# Patient Record
Sex: MALE | Race: WHITE | NOT HISPANIC OR LATINO
[De-identification: names, ages, dates, MRNs, and addresses within clinical notes are randomized per-mention and may not be internally consistent; named-entity substitution may affect disease eponyms.]

---

## 2019-09-09 ENCOUNTER — APPOINTMENT (OUTPATIENT)
Dept: INTERNAL MEDICINE | Facility: CLINIC | Age: 24
End: 2019-09-09

## 2019-11-18 ENCOUNTER — APPOINTMENT (OUTPATIENT)
Dept: INTERNAL MEDICINE | Facility: CLINIC | Age: 24
End: 2019-11-18
Payer: COMMERCIAL

## 2019-11-18 VITALS
RESPIRATION RATE: 14 BRPM | DIASTOLIC BLOOD PRESSURE: 80 MMHG | OXYGEN SATURATION: 98 % | SYSTOLIC BLOOD PRESSURE: 100 MMHG | TEMPERATURE: 98.8 F | WEIGHT: 150 LBS | HEIGHT: 71 IN | BODY MASS INDEX: 21 KG/M2 | HEART RATE: 75 BPM

## 2019-11-18 VITALS
SYSTOLIC BLOOD PRESSURE: 120 MMHG | TEMPERATURE: 98 F | DIASTOLIC BLOOD PRESSURE: 90 MMHG | HEIGHT: 74 IN | HEART RATE: 73 BPM | RESPIRATION RATE: 14 BRPM | WEIGHT: 243 LBS | BODY MASS INDEX: 31.18 KG/M2 | OXYGEN SATURATION: 98 %

## 2019-11-18 DIAGNOSIS — R19.5 OTHER FECAL ABNORMALITIES: ICD-10-CM

## 2019-11-18 DIAGNOSIS — Z87.898 PERSONAL HISTORY OF OTHER SPECIFIED CONDITIONS: ICD-10-CM

## 2019-11-18 DIAGNOSIS — Z78.9 OTHER SPECIFIED HEALTH STATUS: ICD-10-CM

## 2019-11-18 PROCEDURE — 36415 COLL VENOUS BLD VENIPUNCTURE: CPT

## 2019-11-18 PROCEDURE — 99385 PREV VISIT NEW AGE 18-39: CPT | Mod: 25

## 2019-11-18 NOTE — ASSESSMENT
[FreeTextEntry1] : HCM: Requested immunizations for review. Counseled on quitting e-cigs and discussed risks of continued use. Check labs. \par

## 2019-11-18 NOTE — HEALTH RISK ASSESSMENT
[Good] : ~his/her~ current health as good [Yes] : Yes [Monthly or less (1 pt)] : Monthly or less (1 point) [1 or 2 (0 pts)] : 1 or 2 (0 points) [Never (0 pts)] : Never (0 points) [0] : 2) Feeling down, depressed, or hopeless: Not at all (0) [Fully functional (bathing, dressing, toileting, transferring, walking, feeding)] : Fully functional (bathing, dressing, toileting, transferring, walking, feeding) [Fully functional (using the telephone, shopping, preparing meals, housekeeping, doing laundry, using] : Fully functional and needs no help or supervision to perform IADLs (using the telephone, shopping, preparing meals, housekeeping, doing laundry, using transportation, managing medications and managing finances) [HIV test declined] : HIV test declined [[id number]] : 0 [de-identified] : e-cig [Change in mental status noted] : No change in mental status noted [Reports changes in hearing] : Reports no changes in hearing [Reports changes in vision] : Reports no changes in vision Full range of motion of upper and lower extremities, no joint tenderness/swelling.

## 2019-11-18 NOTE — PHYSICAL EXAM
[Well Nourished] : well nourished [No Acute Distress] : no acute distress [Well Developed] : well developed [Well-Appearing] : well-appearing [Normal Sclera/Conjunctiva] : normal sclera/conjunctiva [PERRL] : pupils equal round and reactive to light [EOMI] : extraocular movements intact [Normal Outer Ear/Nose] : the outer ears and nose were normal in appearance [Normal Oropharynx] : the oropharynx was normal [No Lymphadenopathy] : no lymphadenopathy [Supple] : supple [Thyroid Normal, No Nodules] : the thyroid was normal and there were no nodules present [No Respiratory Distress] : no respiratory distress  [No Accessory Muscle Use] : no accessory muscle use [Clear to Auscultation] : lungs were clear to auscultation bilaterally [Regular Rhythm] : with a regular rhythm [Normal Rate] : normal rate  [Normal S1, S2] : normal S1 and S2 [No Murmur] : no murmur heard [No Edema] : there was no peripheral edema [Soft] : abdomen soft [Non Tender] : non-tender [Non-distended] : non-distended [Normal Bowel Sounds] : normal bowel sounds [Normal Posterior Cervical Nodes] : no posterior cervical lymphadenopathy [Normal Anterior Cervical Nodes] : no anterior cervical lymphadenopathy [No CVA Tenderness] : no CVA  tenderness [No Spinal Tenderness] : no spinal tenderness [No Joint Swelling] : no joint swelling [Grossly Normal Strength/Tone] : grossly normal strength/tone [No Rash] : no rash [Coordination Grossly Intact] : coordination grossly intact [No Focal Deficits] : no focal deficits [Normal Gait] : normal gait [Deep Tendon Reflexes (DTR)] : deep tendon reflexes were 2+ and symmetric [Normal Affect] : the affect was normal [Normal Insight/Judgement] : insight and judgment were intact

## 2019-11-18 NOTE — REVIEW OF SYSTEMS
[Anxiety] : anxiety [Fever] : no fever [Chills] : no chills [Night Sweats] : no night sweats [Discharge] : no discharge [Vision Problems] : no vision problems [Earache] : no earache [Nasal Discharge] : no nasal discharge [Itching] : no itching [Sore Throat] : no sore throat [Chest Pain] : no chest pain [Palpitations] : no palpitations [Lower Ext Edema] : no lower extremity edema [Shortness Of Breath] : no shortness of breath [Wheezing] : no wheezing [Dyspnea on Exertion] : not dyspnea on exertion [Cough] : no cough [Nausea] : no nausea [Abdominal Pain] : no abdominal pain [Diarrhea] : no diarrhea [Vomiting] : no vomiting [Constipation] : no constipation [Dysuria] : no dysuria [Muscle Pain] : no muscle pain [Itching] : no itching [Muscle Weakness] : no muscle weakness [Mole Changes] : no mole changes [Skin Rash] : no skin rash [Headache] : no headache [Dizziness] : no dizziness [Suicidal] : not suicidal [Confusion] : no confusion [Easy Bleeding] : no easy bleeding [Depression] : no depression [Swollen Glands] : no swollen glands [Easy Bruising] : no easy bruising

## 2019-11-19 LAB
ALBUMIN SERPL ELPH-MCNC: 4.8 G/DL
ALP BLD-CCNC: 51 U/L
ALT SERPL-CCNC: 15 U/L
ANION GAP SERPL CALC-SCNC: 13 MMOL/L
AST SERPL-CCNC: 16 U/L
BASOPHILS # BLD AUTO: 0.03 K/UL
BASOPHILS NFR BLD AUTO: 0.4 %
BILIRUB SERPL-MCNC: 0.2 MG/DL
BUN SERPL-MCNC: 17 MG/DL
CALCIUM SERPL-MCNC: 9.8 MG/DL
CHLORIDE SERPL-SCNC: 99 MMOL/L
CO2 SERPL-SCNC: 27 MMOL/L
CREAT SERPL-MCNC: 0.94 MG/DL
EOSINOPHIL # BLD AUTO: 0.14 K/UL
EOSINOPHIL NFR BLD AUTO: 2.1 %
GLUCOSE SERPL-MCNC: 85 MG/DL
HCT VFR BLD CALC: 42.2 %
HGB BLD-MCNC: 13.9 G/DL
IMM GRANULOCYTES NFR BLD AUTO: 0.3 %
LYMPHOCYTES # BLD AUTO: 2.37 K/UL
LYMPHOCYTES NFR BLD AUTO: 34.8 %
MAN DIFF?: NORMAL
MCHC RBC-ENTMCNC: 29.3 PG
MCHC RBC-ENTMCNC: 32.9 GM/DL
MCV RBC AUTO: 89 FL
MONOCYTES # BLD AUTO: 0.65 K/UL
MONOCYTES NFR BLD AUTO: 9.5 %
NEUTROPHILS # BLD AUTO: 3.61 K/UL
NEUTROPHILS NFR BLD AUTO: 52.9 %
PLATELET # BLD AUTO: 245 K/UL
POTASSIUM SERPL-SCNC: 4.5 MMOL/L
PROT SERPL-MCNC: 6.9 G/DL
RBC # BLD: 4.74 M/UL
RBC # FLD: 13.1 %
SODIUM SERPL-SCNC: 139 MMOL/L
WBC # FLD AUTO: 6.82 K/UL

## 2020-11-11 ENCOUNTER — APPOINTMENT (OUTPATIENT)
Dept: OTOLARYNGOLOGY | Facility: CLINIC | Age: 25
End: 2020-11-11
Payer: COMMERCIAL

## 2020-11-11 VITALS
HEART RATE: 59 BPM | HEIGHT: 72 IN | TEMPERATURE: 98.6 F | WEIGHT: 150 LBS | SYSTOLIC BLOOD PRESSURE: 123 MMHG | DIASTOLIC BLOOD PRESSURE: 80 MMHG | BODY MASS INDEX: 20.32 KG/M2

## 2020-11-11 PROCEDURE — 99072 ADDL SUPL MATRL&STAF TM PHE: CPT

## 2020-11-11 PROCEDURE — 99204 OFFICE O/P NEW MOD 45 MIN: CPT | Mod: 25

## 2020-11-11 PROCEDURE — 69210 REMOVE IMPACTED EAR WAX UNI: CPT

## 2020-11-11 NOTE — PHYSICAL EXAM
[de-identified] : MARCIN IMPACTED CERUMEN REMOVED [Normal] : mucosa is normal [Midline] : trachea located in midline position

## 2020-11-11 NOTE — REVIEW OF SYSTEMS
[Sneezing] : sneezing [Seasonal Allergies] : seasonal allergies [Ear Pain] : ear pain [Ear Itch] : ear itch [Patient Intake Form Reviewed] : Patient intake form was reviewed

## 2021-01-11 ENCOUNTER — APPOINTMENT (OUTPATIENT)
Dept: OTOLARYNGOLOGY | Facility: CLINIC | Age: 26
End: 2021-01-11

## 2021-02-11 ENCOUNTER — APPOINTMENT (OUTPATIENT)
Dept: OTOLARYNGOLOGY | Facility: CLINIC | Age: 26
End: 2021-02-11
Payer: COMMERCIAL

## 2021-02-11 VITALS
HEIGHT: 72 IN | DIASTOLIC BLOOD PRESSURE: 73 MMHG | SYSTOLIC BLOOD PRESSURE: 117 MMHG | BODY MASS INDEX: 20.32 KG/M2 | TEMPERATURE: 98.1 F | WEIGHT: 150 LBS | HEART RATE: 66 BPM

## 2021-02-11 PROCEDURE — 69210 REMOVE IMPACTED EAR WAX UNI: CPT

## 2021-02-11 PROCEDURE — 99213 OFFICE O/P EST LOW 20 MIN: CPT | Mod: 25

## 2021-02-11 PROCEDURE — 99072 ADDL SUPL MATRL&STAF TM PHE: CPT

## 2021-02-11 NOTE — HISTORY OF PRESENT ILLNESS
[de-identified] : 25 y.o male with history of cerumen buildup. Last cleaning was 3 months ago Feeling clicking sensation in both ears for a month. Now over past day loss of hearing in right side. Used Q-tip on one occasion which was not helpful.

## 2021-02-11 NOTE — ASSESSMENT
[FreeTextEntry1] : AVOID Q TIPS\par BABY OIL 2 DROPS Q MONTHLY\par CORTISPORIN RIGHT EAR\par F/U 3 MONTHS

## 2021-02-11 NOTE — PHYSICAL EXAM
[de-identified] : MARCIN CERUMEN REMOVED/ EXTERNAL OTITIS RIGHT EAR [Normal] : mucosa is normal [Midline] : trachea located in midline position

## 2021-02-11 NOTE — REVIEW OF SYSTEMS
[As Noted in HPI] : as noted in HPI [Negative] : Head and Neck [Patient Intake Form Reviewed] : Patient intake form was reviewed

## 2021-03-14 ENCOUNTER — APPOINTMENT (OUTPATIENT)
Dept: MRI IMAGING | Facility: CLINIC | Age: 26
End: 2021-03-14

## 2021-03-18 ENCOUNTER — APPOINTMENT (OUTPATIENT)
Dept: MRI IMAGING | Facility: CLINIC | Age: 26
End: 2021-03-18
Payer: COMMERCIAL

## 2021-03-18 ENCOUNTER — OUTPATIENT (OUTPATIENT)
Dept: OUTPATIENT SERVICES | Facility: HOSPITAL | Age: 26
LOS: 1 days | End: 2021-03-18
Payer: COMMERCIAL

## 2021-03-18 DIAGNOSIS — Z00.8 ENCOUNTER FOR OTHER GENERAL EXAMINATION: ICD-10-CM

## 2021-03-18 PROCEDURE — 70547 MR ANGIOGRAPHY NECK W/O DYE: CPT

## 2021-03-18 PROCEDURE — 70544 MR ANGIOGRAPHY HEAD W/O DYE: CPT | Mod: 26,59

## 2021-03-18 PROCEDURE — 70551 MRI BRAIN STEM W/O DYE: CPT | Mod: 26

## 2021-03-18 PROCEDURE — 70544 MR ANGIOGRAPHY HEAD W/O DYE: CPT

## 2021-03-18 PROCEDURE — 70547 MR ANGIOGRAPHY NECK W/O DYE: CPT | Mod: 26

## 2021-03-18 PROCEDURE — 70551 MRI BRAIN STEM W/O DYE: CPT

## 2021-05-04 ENCOUNTER — APPOINTMENT (OUTPATIENT)
Dept: OTOLARYNGOLOGY | Facility: CLINIC | Age: 26
End: 2021-05-04

## 2021-06-21 ENCOUNTER — APPOINTMENT (OUTPATIENT)
Dept: INTERNAL MEDICINE | Facility: CLINIC | Age: 26
End: 2021-06-21
Payer: COMMERCIAL

## 2021-06-21 ENCOUNTER — LABORATORY RESULT (OUTPATIENT)
Age: 26
End: 2021-06-21

## 2021-06-21 VITALS
HEART RATE: 68 BPM | TEMPERATURE: 96 F | OXYGEN SATURATION: 98 % | HEIGHT: 72 IN | WEIGHT: 150 LBS | RESPIRATION RATE: 14 BRPM | SYSTOLIC BLOOD PRESSURE: 112 MMHG | DIASTOLIC BLOOD PRESSURE: 70 MMHG | BODY MASS INDEX: 20.32 KG/M2

## 2021-06-21 PROCEDURE — 99213 OFFICE O/P EST LOW 20 MIN: CPT

## 2021-06-21 NOTE — REVIEW OF SYSTEMS
[Fever] : no fever [Chills] : no chills [Night Sweats] : no night sweats [Chest Pain] : no chest pain [Palpitations] : no palpitations [Lower Ext Edema] : no lower extremity edema [Shortness Of Breath] : no shortness of breath [Wheezing] : no wheezing [Cough] : no cough [Dyspnea on Exertion] : not dyspnea on exertion [Abdominal Pain] : no abdominal pain [Nausea] : no nausea [Constipation] : no constipation [Diarrhea] : no diarrhea [Dysuria] : no dysuria

## 2021-06-21 NOTE — PHYSICAL EXAM
[No Acute Distress] : no acute distress [Normal Sclera/Conjunctiva] : normal sclera/conjunctiva [PERRL] : pupils equal round and reactive to light [EOMI] : extraocular movements intact [No Joint Swelling] : no joint swelling [No Rash] : no rash

## 2021-06-21 NOTE — HISTORY OF PRESENT ILLNESS
[FreeTextEntry8] : 25M presents for follow-up of tick bite 5 weeks ago. Since then, denies fever, chills, joint pain, rash.

## 2021-06-22 ENCOUNTER — TRANSCRIPTION ENCOUNTER (OUTPATIENT)
Age: 26
End: 2021-06-22

## 2021-06-23 LAB — B BURGDOR IGG+IGM SER QL IB: NORMAL

## 2021-07-21 ENCOUNTER — APPOINTMENT (OUTPATIENT)
Dept: OTOLARYNGOLOGY | Facility: CLINIC | Age: 26
End: 2021-07-21
Payer: COMMERCIAL

## 2021-07-21 VITALS
BODY MASS INDEX: 20.05 KG/M2 | WEIGHT: 148 LBS | DIASTOLIC BLOOD PRESSURE: 74 MMHG | SYSTOLIC BLOOD PRESSURE: 112 MMHG | HEIGHT: 72 IN | HEART RATE: 67 BPM

## 2021-07-21 PROCEDURE — 69210 REMOVE IMPACTED EAR WAX UNI: CPT

## 2021-07-21 PROCEDURE — 99213 OFFICE O/P EST LOW 20 MIN: CPT | Mod: 25

## 2021-07-21 RX ORDER — NEOMYCIN AND POLYMYXIN B SULFATES AND HYDROCORTISONE OTIC 10; 3.5; 1 MG/ML; MG/ML; [USP'U]/ML
3.5-10000-1 SUSPENSION AURICULAR (OTIC) 4 TIMES DAILY
Qty: 1 | Refills: 2 | Status: COMPLETED | COMMUNITY
Start: 2021-02-11 | End: 2021-07-21

## 2021-07-21 NOTE — PHYSICAL EXAM
[Hearing Love Test (Tuning Fork On Forehead)] : no lateralization of tone [] : septum deviated to the right [Midline] : trachea located in midline position [Normal] : no masses and lesions seen, face is symmetric [FreeTextEntry6] : cerumen impaction removed via curettage, peroxide and suction [FreeTextEntry7] : cerumen impaction removed via curettage, and suction [de-identified] : Class 2 [FreeTextEntry2] : sinuses nontender to percussion.

## 2021-07-21 NOTE — HISTORY OF PRESENT ILLNESS
[de-identified] : The patient presents for routine 3 month ear cleaning. Pt states his last ear cleaning was done 4/2021.

## 2021-07-21 NOTE — REASON FOR VISIT
[Subsequent Evaluation] : a subsequent evaluation for [FreeTextEntry2] : Patient here for an ear cleaning

## 2021-07-21 NOTE — ADDENDUM
[FreeTextEntry1] : Documented by Pierce Cabrera acting as scribe for Dr. Rivera on 07/21/2021.\par \par All Medical record entries made by the Scribe were at my, Dr. Rivera, direction and personally dictated by me on 07/21/2021 . I have reviewed the chart and agree that the record accurately reflects my personal performance of the history, physical exam, assessment and plan. I have also personally directed, reviewed, and agreed with the discharge instructions.

## 2021-07-21 NOTE — CONSULT LETTER
[Dear  ___] : Dear  [unfilled], [Courtesy Letter:] : I had the pleasure of seeing your patient, [unfilled], in my office today. [Please see my note below.] : Please see my note below. [Consult Closing:] : Thank you very much for allowing me to participate in the care of this patient.  If you have any questions, please do not hesitate to contact me. [Sincerely,] : Sincerely, [FreeTextEntry3] : Kirill Rivera MD FACS

## 2021-07-21 NOTE — ASSESSMENT
[FreeTextEntry1] : Reviewed and reconciled medications, allergies, PMHx, PSHx, SocHx, FMHx. \par \par Bilateral cerumen impaction\par dry nose\par deviated septum to the right\par \par Plan:\par Cerumen removed. Hydrogen peroxide used in right ear. FU 3 months\par \par

## 2021-08-23 ENCOUNTER — APPOINTMENT (OUTPATIENT)
Dept: OTOLARYNGOLOGY | Facility: CLINIC | Age: 26
End: 2021-08-23
Payer: COMMERCIAL

## 2021-08-23 VITALS
WEIGHT: 148 LBS | BODY MASS INDEX: 20.05 KG/M2 | SYSTOLIC BLOOD PRESSURE: 93 MMHG | DIASTOLIC BLOOD PRESSURE: 52 MMHG | HEIGHT: 72 IN | HEART RATE: 63 BPM

## 2021-08-23 DIAGNOSIS — J31.0 CHRONIC RHINITIS: ICD-10-CM

## 2021-08-23 DIAGNOSIS — H61.21 IMPACTED CERUMEN, RIGHT EAR: ICD-10-CM

## 2021-08-23 PROCEDURE — 99213 OFFICE O/P EST LOW 20 MIN: CPT | Mod: 25

## 2021-08-23 PROCEDURE — 69210 REMOVE IMPACTED EAR WAX UNI: CPT

## 2021-08-23 NOTE — ASSESSMENT
[FreeTextEntry1] : Reviewed and reconciled medications, allergies, PMHx, PSHx, SocHx, FMHx.\par \par Right cerumen impaction\par deviated septum to the right\par inflamed turbinates.\par \par \par Plan:\par Make solution with equal parts isopropyl Alcohol, white vinegar, and Hydrogen peroxide- 1 ear per day. FU 3 months. \par \par

## 2021-08-23 NOTE — ADDENDUM
[FreeTextEntry1] : Documented by Pierce Cabrera acting as a scribe for Dr. Kirill Rivera on (08/23/2021).\par \par All medical record entries made by the Scribe were at my, Dr. Kirill Rivera's, direction and personally dictated by me on (08/23/2021). I have reviewed the chart and agree that the record accurately reflects my personal performance of the history, physical exam, assessment and plan. I have also personally directed, reviewed, and agree with the discharge instructions.\par \par

## 2021-08-23 NOTE — HISTORY OF PRESENT ILLNESS
[de-identified] : The patient presents with clogging in the right ear starting 1 week ago, and is impacting his hearing. Denies nasal congestion.

## 2021-08-23 NOTE — PHYSICAL EXAM
[Hearing Love Test (Tuning Fork On Forehead)] : no lateralization of tone [] : septum deviated to the right [Midline] : trachea located in midline position [Normal] : no masses and lesions seen, face is symmetric [FreeTextEntry6] : Cerumen impaction removed via suction [FreeTextEntry7] : Cerumen removed via curettage [de-identified] : inflamed turbinates [de-identified] : Class 2 [FreeTextEntry2] : Sinuses nontender to percussion.  No lymphadedenopathy

## 2021-08-23 NOTE — CONSULT LETTER
[Dear  ___] : Dear  [unfilled], [Courtesy Letter:] : I had the pleasure of seeing your patient, [unfilled], in my office today. [Please see my note below.] : Please see my note below. [Consult Closing:] : Thank you very much for allowing me to participate in the care of this patient.  If you have any questions, please do not hesitate to contact me. [Sincerely,] : Sincerely, [FreeTextEntry3] : Dr. Kirill Rivera MD FACS

## 2021-08-29 ENCOUNTER — LABORATORY RESULT (OUTPATIENT)
Age: 26
End: 2021-08-29

## 2021-08-30 ENCOUNTER — APPOINTMENT (OUTPATIENT)
Dept: INTERNAL MEDICINE | Facility: CLINIC | Age: 26
End: 2021-08-30
Payer: COMMERCIAL

## 2021-08-30 VITALS
TEMPERATURE: 97.9 F | OXYGEN SATURATION: 98 % | RESPIRATION RATE: 14 BRPM | HEART RATE: 74 BPM | BODY MASS INDEX: 20.05 KG/M2 | SYSTOLIC BLOOD PRESSURE: 110 MMHG | DIASTOLIC BLOOD PRESSURE: 70 MMHG | HEIGHT: 72 IN | WEIGHT: 148 LBS

## 2021-08-30 DIAGNOSIS — R51.9 HEADACHE, UNSPECIFIED: ICD-10-CM

## 2021-08-30 PROCEDURE — 99213 OFFICE O/P EST LOW 20 MIN: CPT

## 2021-08-30 NOTE — PHYSICAL EXAM
[No Acute Distress] : no acute distress [Normal Sclera/Conjunctiva] : normal sclera/conjunctiva [PERRL] : pupils equal round and reactive to light [EOMI] : extraocular movements intact [Grossly Normal Strength/Tone] : grossly normal strength/tone [No Rash] : no rash [No Focal Deficits] : no focal deficits [Normal Gait] : normal gait

## 2021-08-30 NOTE — ASSESSMENT
[FreeTextEntry1] : Headacke, tick bite: Headache resolved. Check lyme titres and tick PCR. Treatment based on results. \par

## 2021-08-30 NOTE — HISTORY OF PRESENT ILLNESS
[de-identified] : 26M presents for multiple tick bites last week. Reported headache which has resolved. Denies fever, chills, rash, arthralgia. \par

## 2021-08-30 NOTE — REVIEW OF SYSTEMS
[Headache] : headache [Fever] : no fever [Chills] : no chills [Night Sweats] : no night sweats [Chest Pain] : no chest pain [Palpitations] : no palpitations [Lower Ext Edema] : no lower extremity edema [Shortness Of Breath] : no shortness of breath [Wheezing] : no wheezing [Cough] : no cough [Dyspnea on Exertion] : not dyspnea on exertion [Abdominal Pain] : no abdominal pain [Nausea] : no nausea [Constipation] : no constipation [Diarrhea] : no diarrhea [Vomiting] : no vomiting [Dysuria] : no dysuria

## 2021-09-29 ENCOUNTER — APPOINTMENT (OUTPATIENT)
Dept: OTOLARYNGOLOGY | Facility: CLINIC | Age: 26
End: 2021-09-29
Payer: COMMERCIAL

## 2021-09-29 VITALS
HEART RATE: 71 BPM | BODY MASS INDEX: 20.32 KG/M2 | DIASTOLIC BLOOD PRESSURE: 78 MMHG | SYSTOLIC BLOOD PRESSURE: 117 MMHG | HEIGHT: 72 IN | WEIGHT: 150 LBS

## 2021-09-29 PROCEDURE — 99213 OFFICE O/P EST LOW 20 MIN: CPT | Mod: 25

## 2021-09-29 PROCEDURE — 69210 REMOVE IMPACTED EAR WAX UNI: CPT

## 2021-09-29 NOTE — PHYSICAL EXAM
[Midline] : trachea located in midline position [Normal] : no rashes [de-identified] : Bilateral external ears normal appearing. Bilateral cerumen impaction (see procedure below). Once cerumen removed, right EAC with erythema and edema. Left EAC Clear.

## 2021-09-29 NOTE — ASSESSMENT
[FreeTextEntry1] : Mahad Lam presents with acute onset right aural fullness and otalgia. He had bilateral cerumen impaction and after removal, it was noted that his right external auditory canal had inflammation likely secondary to acute otitis externa. We will start ciprodex drops to treat this.\par \par - Start ciprodex drops - 5 drops twice daily to right ear for 10 days.\par - Maintain dry ear precautions.\par - follow up in 2 weeks.

## 2021-09-29 NOTE — REASON FOR VISIT
[Subsequent Evaluation] : a subsequent evaluation for [FreeTextEntry2] : Patient here for an ear infection

## 2021-09-29 NOTE — HISTORY OF PRESENT ILLNESS
[de-identified] : Mahad Lam is a 25 yo male who presents for evlauation of ears. He feels that this morning, he noted that his ear suddenly felt full and his hearing is diminished. He denies any otorrhea, but notes some otalgia. He denies tinnitus, vertigo, recent ear infections. He notes that there is foul smelling odor from the right ear. He denies fevers or chills. He denies nasal congestion, rhinorrhea, or postnasal drainage.

## 2021-09-30 RX ORDER — CIPROFLOXACIN 3 MG/ML
0.3 SOLUTION OPHTHALMIC TWICE DAILY
Qty: 1 | Refills: 1 | Status: ACTIVE | COMMUNITY
Start: 2021-09-30 | End: 1900-01-01

## 2021-09-30 RX ORDER — CIPROFLOXACIN AND DEXAMETHASONE 3; 1 MG/ML; MG/ML
0.3-0.1 SUSPENSION/ DROPS AURICULAR (OTIC) TWICE DAILY
Qty: 1 | Refills: 1 | Status: DISCONTINUED | COMMUNITY
Start: 2021-09-29 | End: 2021-09-30

## 2021-09-30 RX ORDER — PREDNISOLONE SODIUM PHOSPHATE 10 MG/ML
1 SOLUTION/ DROPS OPHTHALMIC TWICE DAILY
Qty: 1 | Refills: 1 | Status: ACTIVE | COMMUNITY
Start: 2021-09-30 | End: 1900-01-01

## 2021-10-08 ENCOUNTER — APPOINTMENT (OUTPATIENT)
Dept: OTOLARYNGOLOGY | Facility: CLINIC | Age: 26
End: 2021-10-08
Payer: COMMERCIAL

## 2021-10-08 VITALS
BODY MASS INDEX: 20.32 KG/M2 | DIASTOLIC BLOOD PRESSURE: 77 MMHG | HEART RATE: 73 BPM | WEIGHT: 150 LBS | HEIGHT: 72 IN | SYSTOLIC BLOOD PRESSURE: 113 MMHG

## 2021-10-08 DIAGNOSIS — H61.22 IMPACTED CERUMEN, LEFT EAR: ICD-10-CM

## 2021-10-08 PROCEDURE — 99213 OFFICE O/P EST LOW 20 MIN: CPT | Mod: 25

## 2021-10-08 PROCEDURE — 69210 REMOVE IMPACTED EAR WAX UNI: CPT

## 2021-10-08 NOTE — PHYSICAL EXAM
[de-identified] : Bilateral external ears normal appearing. Right EAC without erythema or edema. Left EAC with cerumen impaction. Once removed, left EAC normal. [Midline] : trachea located in midline position [Normal] : no rashes

## 2021-10-08 NOTE — HISTORY OF PRESENT ILLNESS
[de-identified] : Mahad Lam is a 27 yo male who presents for evlauation of ears. He feels that this morning, he noted that his right ear suddenly felt full and his hearing is diminished. He denies any otorrhea, but notes some otalgia. He denies tinnitus, vertigo, recent ear infections. He notes that there is foul smelling odor from the right ear. He denies fevers or chills. He denies nasal congestion, rhinorrhea, or postnasal drainage. [FreeTextEntry1] : 10/8/21 - Patient presents for follow-up. He states that his right otalgia has resolved. His hearing is almost back to baseline. He completed his course of ciprodex drops. He denies tinnitus, vertigo, otorrhea, fevers, chills.

## 2021-10-20 ENCOUNTER — APPOINTMENT (OUTPATIENT)
Dept: OTOLARYNGOLOGY | Facility: CLINIC | Age: 26
End: 2021-10-20

## 2021-12-27 ENCOUNTER — OUTPATIENT (OUTPATIENT)
Dept: OUTPATIENT SERVICES | Facility: HOSPITAL | Age: 26
LOS: 1 days | End: 2021-12-27
Payer: COMMERCIAL

## 2021-12-27 DIAGNOSIS — Z20.828 CONTACT WITH AND (SUSPECTED) EXPOSURE TO OTHER VIRAL COMMUNICABLE DISEASES: ICD-10-CM

## 2021-12-27 LAB — SARS-COV-2 RNA SPEC QL NAA+PROBE: DETECTED

## 2021-12-27 PROCEDURE — U0003: CPT

## 2021-12-27 PROCEDURE — C9803: CPT

## 2021-12-27 PROCEDURE — U0005: CPT

## 2021-12-28 DIAGNOSIS — Z20.828 CONTACT WITH AND (SUSPECTED) EXPOSURE TO OTHER VIRAL COMMUNICABLE DISEASES: ICD-10-CM

## 2022-03-08 ENCOUNTER — APPOINTMENT (OUTPATIENT)
Dept: OTOLARYNGOLOGY | Facility: CLINIC | Age: 27
End: 2022-03-08
Payer: COMMERCIAL

## 2022-03-08 VITALS
DIASTOLIC BLOOD PRESSURE: 72 MMHG | SYSTOLIC BLOOD PRESSURE: 107 MMHG | HEIGHT: 72 IN | HEART RATE: 80 BPM | WEIGHT: 150 LBS | BODY MASS INDEX: 20.32 KG/M2

## 2022-03-08 DIAGNOSIS — J34.2 DEVIATED NASAL SEPTUM: ICD-10-CM

## 2022-03-08 DIAGNOSIS — J34.3 HYPERTROPHY OF NASAL TURBINATES: ICD-10-CM

## 2022-03-08 DIAGNOSIS — J30.9 ALLERGIC RHINITIS, UNSPECIFIED: ICD-10-CM

## 2022-03-08 PROCEDURE — 69210 REMOVE IMPACTED EAR WAX UNI: CPT | Mod: 59

## 2022-03-08 PROCEDURE — 31231 NASAL ENDOSCOPY DX: CPT

## 2022-03-08 PROCEDURE — 99213 OFFICE O/P EST LOW 20 MIN: CPT | Mod: 25

## 2022-03-08 NOTE — PHYSICAL EXAM
[Nasal Endoscopy Performed] : nasal endoscopy was performed, see procedure section for findings [] : septum deviated bilaterally [Midline] : trachea located in midline position [Normal] : no rashes [de-identified] : Bilateral external ears normal appearing. Bilateral cerumen impaction [de-identified] : Bilateral inferior turbinate hypertrophy [de-identified] : Thin secretions bilaterally

## 2022-03-08 NOTE — HISTORY OF PRESENT ILLNESS
[de-identified] : Mahad Lam is a 25 yo male who presents for evlauation of ears. He feels that this morning, he noted that his right ear suddenly felt full and his hearing is diminished. He denies any otorrhea, but notes some otalgia. He denies tinnitus, vertigo, recent ear infections. He notes that there is foul smelling odor from the right ear. He denies fevers or chills. He denies nasal congestion, rhinorrhea, or postnasal drainage. [FreeTextEntry1] : 10/8/21 - Patient presents for follow-up. He states that his right otalgia has resolved. His hearing is almost back to baseline. He completed his course of ciprodex drops. He denies tinnitus, vertigo, otorrhea, fevers, chills.\par \par 3/8/22 - Patient presents for evaluation. He has history of otitis externa. He states that last night, he felt that his right ear was full. This improved with moving his ear. He denies otalgia, otorrhea, tinnitus, vertigo, or hearing change. He denies fevers or chills. He notes chronic nasal congestion, rhinorrhea, and postnasal drainage. He notes history of allergies, although has had previous allerg ytesting that was mostly negative per his report. He denies sinus pressure or recurrent sinus infections. He notes frequent sneezing and epiphora.

## 2022-03-08 NOTE — ASSESSMENT
[FreeTextEntry1] : Mahad Lam presents for evaluation of aural fullness, found to have bilateral cerumen impaction which was removed. He notes history of chronic nasal congestion and allergy symptoms. He has had allergy testing previously which was mildly positive but he would like to see an allergist again. He denies history of chronic sinusitis symptoms. He does have septal deviation, thin secretions, mucosal inflammation, and bilateral inferior turbinate hypertrophy on endoscopic exam. Will start treatment as below and refer for allergy testing. At follow-up visit, depending on his symptoms, can consider septoplasty, submucous resection of inferior turbinates. Can consider CT sinus at that time as well.\par \par - Start nasal saline sprays TID\par - Start flonase 2 sprays BID\par - Start daily antihistamine\par - Referral to allergy\par - Follow up in 1 month\par

## 2022-03-08 NOTE — PHYSICAL EXAM
[Nasal Endoscopy Performed] : nasal endoscopy was performed, see procedure section for findings [] : septum deviated bilaterally [Midline] : trachea located in midline position [Normal] : no rashes [de-identified] : Bilateral external ears normal appearing. Bilateral cerumen impaction [de-identified] : Bilateral inferior turbinate hypertrophy [de-identified] : Thin secretions bilaterally

## 2022-03-08 NOTE — HISTORY OF PRESENT ILLNESS
[FreeTextEntry1] : 10/8/21 - Patient presents for follow-up. He states that his right otalgia has resolved. His hearing is almost back to baseline. He completed his course of ciprodex drops. He denies tinnitus, vertigo, otorrhea, fevers, chills.\par \par 3/8/22 - Patient presents for evaluation. He has history of otitis externa. He states that last night, he felt that his right ear was full. This improved with moving his ear. He denies otalgia, otorrhea, tinnitus, vertigo, or hearing change. He denies fevers or chills. He notes chronic nasal congestion, rhinorrhea, and postnasal drainage. He notes history of allergies, although has had previous allerg ytesting that was mostly negative per his report. He denies sinus pressure or recurrent sinus infections. He notes frequent sneezing and epiphora. [de-identified] : Mahad Lam is a 25 yo male who presents for evlauation of ears. He feels that this morning, he noted that his right ear suddenly felt full and his hearing is diminished. He denies any otorrhea, but notes some otalgia. He denies tinnitus, vertigo, recent ear infections. He notes that there is foul smelling odor from the right ear. He denies fevers or chills. He denies nasal congestion, rhinorrhea, or postnasal drainage.

## 2022-07-14 ENCOUNTER — APPOINTMENT (OUTPATIENT)
Dept: INTERNAL MEDICINE | Facility: CLINIC | Age: 27
End: 2022-07-14

## 2022-07-14 VITALS
RESPIRATION RATE: 16 BRPM | DIASTOLIC BLOOD PRESSURE: 62 MMHG | HEART RATE: 75 BPM | TEMPERATURE: 97.1 F | WEIGHT: 150 LBS | BODY MASS INDEX: 20.32 KG/M2 | SYSTOLIC BLOOD PRESSURE: 116 MMHG | HEIGHT: 72 IN | OXYGEN SATURATION: 96 %
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
## 2022-07-14 DIAGNOSIS — H60.501 UNSPECIFIED ACUTE NONINFECTIVE OTITIS EXTERNA, RIGHT EAR: ICD-10-CM

## 2022-07-14 PROCEDURE — 99214 OFFICE O/P EST MOD 30 MIN: CPT

## 2022-07-14 NOTE — HISTORY OF PRESENT ILLNESS
[FreeTextEntry8] : 27M reports increasing anxiety and depression. Reports anxiety, panic, depressed mood. PHQ9 assessed below. \par Reports increased gambling.

## 2022-07-14 NOTE — PHYSICAL EXAM
[No Acute Distress] : no acute distress [Normal Affect] : the affect was normal [Normal Insight/Judgement] : insight and judgment were intact [No Respiratory Distress] : no respiratory distress  [No Accessory Muscle Use] : no accessory muscle use [Clear to Auscultation] : lungs were clear to auscultation bilaterally [Normal Rate] : normal rate  [Regular Rhythm] : with a regular rhythm [Normal S1, S2] : normal S1 and S2 [No Murmur] : no murmur heard [Soft] : abdomen soft [Non Tender] : non-tender [Non-distended] : non-distended [Normal Bowel Sounds] : normal bowel sounds

## 2022-07-14 NOTE — ASSESSMENT
[FreeTextEntry1] : anxiety, depression: Concern for bipolar. Refer to behavioral health to help with psychiatrist referral. Will hold off on SSRI until psychiatrist is consulted. Check labs to rule out metabolic causes. \par \par

## 2022-07-14 NOTE — HEALTH RISK ASSESSMENT
[1/2 of Days or More (2)] : 3.) Trouble falling asleep, or sleeping too much? Half the days or more [Several Days (1)] : 5.) Poor appetite or overeating? Several days [Nearly Every Day (3)] : 8.) Moving or speaking so slowly that other people could have noticed, or the opposite, moving or speaking faster than usual? Nearly every day [Severe] : severity of depression is severe [Extremely Difficult] : How difficult have these problems made it for you to do your work, take care of things at home, or get along with people? Extremely difficult [PHQ-9 Positive] : PHQ-9 Positive [I have developed a follow-up plan documented below in the note.] : I have developed a follow-up plan documented below in the note. [[id number]] : 20

## 2022-07-18 LAB
ALBUMIN SERPL ELPH-MCNC: 5.1 G/DL
ALP BLD-CCNC: 63 U/L
ALT SERPL-CCNC: 16 U/L
ANION GAP SERPL CALC-SCNC: 12 MMOL/L
AST SERPL-CCNC: 20 U/L
BASOPHILS # BLD AUTO: 0.03 K/UL
BASOPHILS NFR BLD AUTO: 0.4 %
BILIRUB SERPL-MCNC: 0.3 MG/DL
BUN SERPL-MCNC: 17 MG/DL
CALCIUM SERPL-MCNC: 9.7 MG/DL
CHLORIDE SERPL-SCNC: 102 MMOL/L
CO2 SERPL-SCNC: 28 MMOL/L
CREAT SERPL-MCNC: 1.03 MG/DL
EGFR: 103 ML/MIN/1.73M2
EOSINOPHIL # BLD AUTO: 0.36 K/UL
EOSINOPHIL NFR BLD AUTO: 5.2 %
FOLATE SERPL-MCNC: 10.9 NG/ML
GLUCOSE SERPL-MCNC: 95 MG/DL
HCT VFR BLD CALC: 44.2 %
HGB BLD-MCNC: 14.4 G/DL
IMM GRANULOCYTES NFR BLD AUTO: 0.3 %
LYMPHOCYTES # BLD AUTO: 2.51 K/UL
LYMPHOCYTES NFR BLD AUTO: 36 %
MAN DIFF?: NORMAL
MCHC RBC-ENTMCNC: 29.7 PG
MCHC RBC-ENTMCNC: 32.6 GM/DL
MCV RBC AUTO: 91.1 FL
MONOCYTES # BLD AUTO: 0.63 K/UL
MONOCYTES NFR BLD AUTO: 9 %
NEUTROPHILS # BLD AUTO: 3.42 K/UL
NEUTROPHILS NFR BLD AUTO: 49.1 %
PLATELET # BLD AUTO: 260 K/UL
POTASSIUM SERPL-SCNC: 4.4 MMOL/L
PROT SERPL-MCNC: 7.1 G/DL
RBC # BLD: 4.85 M/UL
RBC # FLD: 13.3 %
SODIUM SERPL-SCNC: 141 MMOL/L
TSH SERPL-ACNC: 1.27 UIU/ML
VIT B12 SERPL-MCNC: 463 PG/ML
WBC # FLD AUTO: 6.97 K/UL

## 2022-07-19 ENCOUNTER — TRANSCRIPTION ENCOUNTER (OUTPATIENT)
Age: 27
End: 2022-07-19

## 2022-08-25 ENCOUNTER — TRANSCRIPTION ENCOUNTER (OUTPATIENT)
Age: 27
End: 2022-08-25

## 2022-09-09 ENCOUNTER — TRANSCRIPTION ENCOUNTER (OUTPATIENT)
Age: 27
End: 2022-09-09

## 2022-09-12 ENCOUNTER — TRANSCRIPTION ENCOUNTER (OUTPATIENT)
Age: 27
End: 2022-09-12

## 2023-01-06 ENCOUNTER — APPOINTMENT (OUTPATIENT)
Dept: OTOLARYNGOLOGY | Facility: CLINIC | Age: 28
End: 2023-01-06
Payer: COMMERCIAL

## 2023-01-06 ENCOUNTER — NON-APPOINTMENT (OUTPATIENT)
Age: 28
End: 2023-01-06

## 2023-01-06 VITALS
WEIGHT: 150 LBS | HEIGHT: 72 IN | DIASTOLIC BLOOD PRESSURE: 75 MMHG | BODY MASS INDEX: 20.32 KG/M2 | HEART RATE: 71 BPM | SYSTOLIC BLOOD PRESSURE: 119 MMHG

## 2023-01-06 DIAGNOSIS — H60.60 UNSPECIFIED CHRONIC OTITIS EXTERNA, UNSPECIFIED EAR: ICD-10-CM

## 2023-01-06 DIAGNOSIS — H61.23 IMPACTED CERUMEN, BILATERAL: ICD-10-CM

## 2023-01-06 PROCEDURE — 99214 OFFICE O/P EST MOD 30 MIN: CPT | Mod: 25

## 2023-01-06 PROCEDURE — 69210 REMOVE IMPACTED EAR WAX UNI: CPT

## 2023-01-06 RX ORDER — NEOMYCIN AND POLYMYXIN B SULFATES AND HYDROCORTISONE OTIC 10; 3.5; 1 MG/ML; MG/ML; [USP'U]/ML
3.5-10000-1 SUSPENSION AURICULAR (OTIC)
Qty: 1 | Refills: 1 | Status: ACTIVE | COMMUNITY
Start: 2023-01-06 | End: 1900-01-01

## 2023-01-06 NOTE — PHYSICAL EXAM
[de-identified] : MARCIN IMPACTED CERUMEN REMOVED/ CANAL IRRIATTED/ ECZEMA [Normal] : mucosa is normal [Midline] : trachea located in midline position

## 2023-04-24 ENCOUNTER — APPOINTMENT (OUTPATIENT)
Dept: INTERNAL MEDICINE | Facility: CLINIC | Age: 28
End: 2023-04-24
Payer: COMMERCIAL

## 2023-04-24 ENCOUNTER — NON-APPOINTMENT (OUTPATIENT)
Age: 28
End: 2023-04-24

## 2023-04-24 VITALS
TEMPERATURE: 98.6 F | RESPIRATION RATE: 14 BRPM | HEART RATE: 85 BPM | HEIGHT: 72 IN | SYSTOLIC BLOOD PRESSURE: 108 MMHG | DIASTOLIC BLOOD PRESSURE: 70 MMHG | BODY MASS INDEX: 20.32 KG/M2 | OXYGEN SATURATION: 97 % | WEIGHT: 150 LBS

## 2023-04-24 DIAGNOSIS — F41.9 ANXIETY DISORDER, UNSPECIFIED: ICD-10-CM

## 2023-04-24 DIAGNOSIS — F32.A ANXIETY DISORDER, UNSPECIFIED: ICD-10-CM

## 2023-04-24 DIAGNOSIS — Z00.00 ENCOUNTER FOR GENERAL ADULT MEDICAL EXAMINATION W/OUT ABNORMAL FINDINGS: ICD-10-CM

## 2023-04-24 PROCEDURE — 99395 PREV VISIT EST AGE 18-39: CPT | Mod: 25

## 2023-04-24 PROCEDURE — 93000 ELECTROCARDIOGRAM COMPLETE: CPT | Mod: 59

## 2023-04-24 NOTE — ASSESSMENT
[FreeTextEntry1] : Anxiety, depression: On sertraline. He is following with Dr Delgado. \par HCM: Check labs. Will discuss results. EKG reviewed and shows sinus rhythm. \par

## 2023-04-24 NOTE — REVIEW OF SYSTEMS
[Anxiety] : anxiety [Depression] : depression [Fever] : no fever [Chills] : no chills [Night Sweats] : no night sweats [Discharge] : no discharge [Vision Problems] : no vision problems [Itching] : no itching [Earache] : no earache [Nasal Discharge] : no nasal discharge [Sore Throat] : no sore throat [Chest Pain] : no chest pain [Palpitations] : no palpitations [Lower Ext Edema] : no lower extremity edema [Shortness Of Breath] : no shortness of breath [Wheezing] : no wheezing [Cough] : no cough [Dyspnea on Exertion] : not dyspnea on exertion [Abdominal Pain] : no abdominal pain [Nausea] : no nausea [Constipation] : no constipation [Diarrhea] : no diarrhea [Vomiting] : no vomiting [Melena] : no melena [Dysuria] : no dysuria [Muscle Pain] : no muscle pain [Muscle Weakness] : no muscle weakness [Skin Rash] : no skin rash [Dizziness] : no dizziness [Headache] : no headache [Suicidal] : not suicidal [Easy Bleeding] : no easy bleeding [Easy Bruising] : no easy bruising [Swollen Glands] : no swollen glands

## 2023-04-24 NOTE — PHYSICAL EXAM
[No Acute Distress] : no acute distress [Normal Sclera/Conjunctiva] : normal sclera/conjunctiva [PERRL] : pupils equal round and reactive to light [EOMI] : extraocular movements intact [Normal TMs] : both tympanic membranes were normal [No Lymphadenopathy] : no lymphadenopathy [Supple] : supple [Thyroid Normal, No Nodules] : the thyroid was normal and there were no nodules present [No Respiratory Distress] : no respiratory distress  [No Accessory Muscle Use] : no accessory muscle use [Clear to Auscultation] : lungs were clear to auscultation bilaterally [Normal Rate] : normal rate  [Regular Rhythm] : with a regular rhythm [Normal S1, S2] : normal S1 and S2 [No Murmur] : no murmur heard [No Edema] : there was no peripheral edema [Soft] : abdomen soft [Non-distended] : non-distended [Non Tender] : non-tender [Normal Bowel Sounds] : normal bowel sounds [Normal Posterior Cervical Nodes] : no posterior cervical lymphadenopathy [Normal Anterior Cervical Nodes] : no anterior cervical lymphadenopathy [No Spinal Tenderness] : no spinal tenderness [Grossly Normal Strength/Tone] : grossly normal strength/tone [No Focal Deficits] : no focal deficits [Normal Gait] : normal gait [Normal Affect] : the affect was normal [Normal Insight/Judgement] : insight and judgment were intact

## 2023-04-24 NOTE — HEALTH RISK ASSESSMENT
[Good] : ~his/her~ current health as good [Fair] :  ~his/her~ mood as fair [Several Days (1)] : 7.) Trouble concentrating on things, such as reading a newspaper or watching television? Several days [Not at All (0)] : 9.) Thoughts that you would be off dead or of hurting yourself in some way? Not at all [PHQ-9 Negative - No further assessment needed] : PHQ-9 Negative - No further assessment needed [No] : No [Never] : Never [[id number]] : 4 [Change in mental status noted] : No change in mental status noted [de-identified] :

## 2023-04-25 LAB
25(OH)D3 SERPL-MCNC: 29.8 NG/ML
ALBUMIN SERPL ELPH-MCNC: 4.9 G/DL
ALP BLD-CCNC: 63 U/L
ALT SERPL-CCNC: 14 U/L
ANION GAP SERPL CALC-SCNC: 13 MMOL/L
AST SERPL-CCNC: 24 U/L
BASOPHILS # BLD AUTO: 0.02 K/UL
BASOPHILS NFR BLD AUTO: 0.3 %
BILIRUB SERPL-MCNC: 0.4 MG/DL
BUN SERPL-MCNC: 15 MG/DL
CALCIUM SERPL-MCNC: 9.9 MG/DL
CHLORIDE SERPL-SCNC: 101 MMOL/L
CHOLEST SERPL-MCNC: 220 MG/DL
CO2 SERPL-SCNC: 26 MMOL/L
CREAT SERPL-MCNC: 0.93 MG/DL
EGFR: 117 ML/MIN/1.73M2
EOSINOPHIL # BLD AUTO: 0.2 K/UL
EOSINOPHIL NFR BLD AUTO: 3.3 %
ESTIMATED AVERAGE GLUCOSE: 105 MG/DL
FOLATE SERPL-MCNC: 10.6 NG/ML
GLUCOSE SERPL-MCNC: 89 MG/DL
HBA1C MFR BLD HPLC: 5.3 %
HCT VFR BLD CALC: 42.7 %
HDLC SERPL-MCNC: 60 MG/DL
HGB BLD-MCNC: 14 G/DL
IMM GRANULOCYTES NFR BLD AUTO: 0.3 %
LDLC SERPL CALC-MCNC: 140 MG/DL
LYMPHOCYTES # BLD AUTO: 2.13 K/UL
LYMPHOCYTES NFR BLD AUTO: 34.8 %
MAN DIFF?: NORMAL
MCHC RBC-ENTMCNC: 29.5 PG
MCHC RBC-ENTMCNC: 32.8 GM/DL
MCV RBC AUTO: 90.1 FL
MONOCYTES # BLD AUTO: 0.54 K/UL
MONOCYTES NFR BLD AUTO: 8.8 %
NEUTROPHILS # BLD AUTO: 3.21 K/UL
NEUTROPHILS NFR BLD AUTO: 52.5 %
NONHDLC SERPL-MCNC: 160 MG/DL
PLATELET # BLD AUTO: 221 K/UL
POTASSIUM SERPL-SCNC: 4.5 MMOL/L
PROT SERPL-MCNC: 7.2 G/DL
RBC # BLD: 4.74 M/UL
RBC # FLD: 13 %
SODIUM SERPL-SCNC: 140 MMOL/L
TRIGL SERPL-MCNC: 99 MG/DL
TSH SERPL-ACNC: 0.91 UIU/ML
VIT B12 SERPL-MCNC: 519 PG/ML
WBC # FLD AUTO: 6.12 K/UL

## 2023-04-27 ENCOUNTER — APPOINTMENT (OUTPATIENT)
Dept: OTOLARYNGOLOGY | Facility: CLINIC | Age: 28
End: 2023-04-27

## 2023-08-27 ENCOUNTER — RX RENEWAL (OUTPATIENT)
Age: 28
End: 2023-08-27

## 2023-08-27 RX ORDER — SERTRALINE HYDROCHLORIDE 100 MG/1
100 TABLET, FILM COATED ORAL
Qty: 90 | Refills: 1 | Status: ACTIVE | COMMUNITY
Start: 2023-05-23 | End: 1900-01-01

## 2023-10-17 ENCOUNTER — NON-APPOINTMENT (OUTPATIENT)
Age: 28
End: 2023-10-17

## 2023-10-19 ENCOUNTER — APPOINTMENT (OUTPATIENT)
Dept: UROLOGY | Facility: CLINIC | Age: 28
End: 2023-10-19

## 2024-02-20 ENCOUNTER — APPOINTMENT (OUTPATIENT)
Dept: ORTHOPEDIC SURGERY | Facility: CLINIC | Age: 29
End: 2024-02-20
Payer: COMMERCIAL

## 2024-02-20 ENCOUNTER — NON-APPOINTMENT (OUTPATIENT)
Age: 29
End: 2024-02-20

## 2024-02-20 VITALS
HEART RATE: 75 BPM | DIASTOLIC BLOOD PRESSURE: 72 MMHG | HEIGHT: 71 IN | SYSTOLIC BLOOD PRESSURE: 137 MMHG | BODY MASS INDEX: 21 KG/M2 | WEIGHT: 150 LBS

## 2024-02-20 PROCEDURE — 99204 OFFICE O/P NEW MOD 45 MIN: CPT

## 2024-02-20 NOTE — HISTORY OF PRESENT ILLNESS
[Worsening] : worsening [___ days] : [unfilled] day(s) ago [de-identified] : REGINA is a 28 year male who presents today with complaints of left shoulder pain. He states he fell while snowboarding on 2/17/2023. He felt a crack and immediate pain. He was taken to a local hospital where he received X-rays revealing a clavicle fracture. He is here today for further evaluation of his injury. He notes he feels pain at night and experiencing numbness/tingling in his fingers of the involved extremity periodically throughout the day. He presents today with a sling. He states he is right hand dominant.

## 2024-02-20 NOTE — REASON FOR VISIT
[Initial Visit] : an initial visit for [Other: ____] : [unfilled] [Parent] : parent [FreeTextEntry2] : lt shoulder injury

## 2024-02-21 ENCOUNTER — OUTPATIENT (OUTPATIENT)
Dept: INPATIENT UNIT | Facility: HOSPITAL | Age: 29
LOS: 1 days | Discharge: HOME | End: 2024-02-21
Payer: COMMERCIAL

## 2024-02-21 ENCOUNTER — TRANSCRIPTION ENCOUNTER (OUTPATIENT)
Age: 29
End: 2024-02-21

## 2024-02-21 ENCOUNTER — APPOINTMENT (OUTPATIENT)
Dept: ORTHOPEDIC SURGERY | Facility: HOSPITAL | Age: 29
End: 2024-02-21

## 2024-02-21 VITALS
RESPIRATION RATE: 14 BRPM | TEMPERATURE: 98 F | SYSTOLIC BLOOD PRESSURE: 114 MMHG | OXYGEN SATURATION: 98 % | HEART RATE: 66 BPM | DIASTOLIC BLOOD PRESSURE: 63 MMHG

## 2024-02-21 VITALS
WEIGHT: 149.91 LBS | HEIGHT: 71 IN | DIASTOLIC BLOOD PRESSURE: 82 MMHG | RESPIRATION RATE: 12 BRPM | OXYGEN SATURATION: 100 % | TEMPERATURE: 99 F | HEART RATE: 63 BPM | SYSTOLIC BLOOD PRESSURE: 121 MMHG

## 2024-02-21 DIAGNOSIS — S42.002A FRACTURE OF UNSPECIFIED PART OF LEFT CLAVICLE, INITIAL ENCOUNTER FOR CLOSED FRACTURE: ICD-10-CM

## 2024-02-21 PROCEDURE — C1713: CPT

## 2024-02-21 PROCEDURE — 76000 FLUOROSCOPY <1 HR PHYS/QHP: CPT

## 2024-02-21 PROCEDURE — 23515 OPTX CLAVICULAR FX W/INT FIX: CPT | Mod: LT

## 2024-02-21 PROCEDURE — C9399: CPT

## 2024-02-21 RX ORDER — OXYCODONE HYDROCHLORIDE 5 MG/1
10 TABLET ORAL ONCE
Refills: 0 | Status: DISCONTINUED | OUTPATIENT
Start: 2024-02-21 | End: 2024-02-21

## 2024-02-21 RX ORDER — ONDANSETRON 8 MG/1
1 TABLET, FILM COATED ORAL
Qty: 28 | Refills: 0
Start: 2024-02-21 | End: 2024-02-27

## 2024-02-21 RX ORDER — SENNOSIDES/DOCUSATE SODIUM 8.6MG-50MG
2 TABLET ORAL
Qty: 32 | Refills: 0
Start: 2024-02-21 | End: 2024-02-24

## 2024-02-21 RX ORDER — OXYCODONE HYDROCHLORIDE 5 MG/1
1 TABLET ORAL
Qty: 20 | Refills: 0
Start: 2024-02-21 | End: 2024-02-25

## 2024-02-21 RX ORDER — ACETAMINOPHEN 500 MG
2 TABLET ORAL
Qty: 32 | Refills: 0
Start: 2024-02-21 | End: 2024-02-24

## 2024-02-21 RX ORDER — OXYCODONE HYDROCHLORIDE 5 MG/1
5 TABLET ORAL ONCE
Refills: 0 | Status: DISCONTINUED | OUTPATIENT
Start: 2024-02-21 | End: 2024-02-21

## 2024-02-21 RX ORDER — FENTANYL CITRATE 50 UG/ML
50 INJECTION INTRAVENOUS
Refills: 0 | Status: DISCONTINUED | OUTPATIENT
Start: 2024-02-21 | End: 2024-02-21

## 2024-02-21 RX ORDER — SERTRALINE 25 MG/1
1 TABLET, FILM COATED ORAL
Refills: 0 | DISCHARGE

## 2024-02-21 RX ORDER — ONDANSETRON 8 MG/1
4 TABLET, FILM COATED ORAL EVERY 6 HOURS
Refills: 0 | Status: DISCONTINUED | OUTPATIENT
Start: 2024-02-21 | End: 2024-02-21

## 2024-02-21 RX ORDER — SODIUM CHLORIDE 9 MG/ML
1000 INJECTION, SOLUTION INTRAVENOUS
Refills: 0 | Status: DISCONTINUED | OUTPATIENT
Start: 2024-02-21 | End: 2024-02-21

## 2024-02-21 RX ADMIN — OXYCODONE HYDROCHLORIDE 10 MILLIGRAM(S): 5 TABLET ORAL at 14:38

## 2024-02-21 RX ADMIN — FENTANYL CITRATE 50 MICROGRAM(S): 50 INJECTION INTRAVENOUS at 14:35

## 2024-02-21 NOTE — BRIEF OPERATIVE NOTE - NSICDXBRIEFPROCEDURE_GEN_ALL_CORE_FT
PROCEDURES:  Open reduction and internal fixation of left clavicle 21-Feb-2024 13:41:46  Jayant Vizcarra

## 2024-02-21 NOTE — ASU DISCHARGE PLAN (ADULT/PEDIATRIC) - CARE PROVIDER_API CALL
Rakan Yates  Orthopaedic Surgery  [address], [address]  [place] [address]  Phone: [phone]  Fax: [phone]  Follow Up Time: 2 weeks

## 2024-02-21 NOTE — ASU PATIENT PROFILE, ADULT - PATIENT REPRESENTATIVE NAME
Date of Service: 08/08/2018    SUBJECTIVE:  The patient is coming in for followup regarding foot pain.  The patient was seen by walk-in clinic on 07/23/2018.  The patient thought that he might have a toe sprain at the time.  The patient notes that he had significant pain on 07/04/2018 requiring actually him to start limping significantly.  The patient notes no specific injury.  He notes that it was gradually getting worse as he was walking.  The patient notes that he has not had any problems with bruising.  He did think it was somewhat swollen.  That was on the third toe on the right side.  The patient subsequently has now noted pain on the left foot of the fourth toe which is more problematic.  The patient is coming in on crutches.  He has been offloading this now for 2 weeks.  He notes no improvement with the pain.  The patient notes that the pain feels like it is deep within the fourth toe.  He feels like it is somewhat swollen but there has been no bruising.  He has not had any fever or chills.  He denies any specific injury that he can remember.  The patient took 10 days of Naproxen, which did not really seem to help.  The patient does feel like both feet are inflamed and somewhat painful on the inside.    PHYSICAL EXAMINATION:  VITAL SIGNS:  Blood pressure is 110/70, pulse of 95, respirations 16.  GENERAL:  A pleasant white male in no acute distress.    MUSCULOSKELETAL: Foot exam in general looks normal.  There is no notable swelling that is appreciated on either foot.  He has good dorsalis pedis pulses.  He has good dorsiflexion and plantarflexion.  There is no notable erythema or ecchymosis that is seen.  There is tenderness at the distal metatarsal head of the fourth foot, but there is no notable warmth and there is no notable erythema.  He has good dorsi and plantarflexion of all toes.  He has full rotation of the ankle.  There is no tenderness in the medial or lateral malleolus.    ASSESSMENT AND  PLAN:  1.  Foot pain, which is prominent in both feet but most predominant in pain of the fourth toe on the left side.  With this, since it is not improved but almost over a month, his sister was recently diagnosed with autoimmune process.  I am going to check an KALEIGH, uric acid level.  He has been noting fasciculations and a slight amount of numbness and tingling, so I am going to check a B12 level along with a metabolic panel.  2.  Numbness and tingling of the foot, which he is not sure if this is true or not.  With this possibly being considered, I am going to check a B12 level.  3.  Fasciculations.  He has noted somewhat of fasciculations of the muscles at times.  Will check a metabolic panel.  If there is no improvement, I am going to have him see podiatry.  I am going to have him use a hammertoe splint on the fourth toe to see if that makes any difference.  4.  Headache, which seems to be under good control for right now.  He has continued to use the Diltiazem without significant side effects.      Dictated By: Lc Clinton MD  Signing Provider: Lc Clinton MD    RL/SP2 ([id number])  DD: 08/08/2018 10:12:14 TD: 08/09/2018 08:01:43    Copy Sent To:    Deborah Lam

## 2024-02-21 NOTE — ASU PATIENT PROFILE, ADULT - TOBACCO USE
You can access the FollowMyHealth Patient Portal offered by [facility] by registering at the following website: [email or web address]. By joining [employer]’s FollowMyHealth portal, you will also be able to view your health information using other applications (apps) compatible with our system.
Current some day smoker

## 2024-02-21 NOTE — ASU DISCHARGE PLAN (ADULT/PEDIATRIC) - ASU DC SPECIAL INSTRUCTIONSFT
Sadia Simon (:  1962) is a 64 y.o. female,Established patient, here for evaluation of the following chief complaint(s):  Follow-up and Diabetes         ASSESSMENT/PLAN:  1. Uncontrolled type 2 diabetes mellitus with hyperglycemia (HCC)  -     POCT glycosylated hemoglobin (Hb A1C)  -     metFORMIN (GLUCOPHAGE) 1000 MG tablet; Take 1 tablet by mouth 2 times daily (with meals), Disp-180 tablet, R-1Normal  -     Semaglutide,0.25 or 0.5MG/DOS, (OZEMPIC, 0.25 OR 0.5 MG/DOSE,) 2 MG/1.5ML SOPN; Inject 0.25 mg into the skin once a week, Disp-1 pen, R-0Normal  -     CBC with Auto Differential; Future  -     Comprehensive Metabolic Panel; Future  -     Urinalysis with Microscopic; Future  -     Lipid, Fasting; Future  -     Vitamin D 25 Hydroxy; Future  -     Insulin Pen Needle (PEN NEEDLES 3/16\") 31G X 5 MM MISC; DAILY Starting Fri 3/25/2022, Disp-100 each, R-3, Normal  2. Other hyperlipidemia  -     Lipid, Fasting; Future  -     atorvastatin (LIPITOR) 10 MG tablet; Take 1 tablet by mouth daily, Disp-90 tablet, R-1Normal  3. Galvin's esophagus without dysplasia  4. Gastroesophageal reflux disease without esophagitis  5. Anxiety  6. Insomnia, unspecified type  7. Vitamin D deficiency    Stop Janumet  Start Metformin 1000 mg twice daily with food  Continue [employer], 0.25 mg injected weekly. Can consider Sulfonylurea if needed, if unable to tolerate Ozempic. May ultimately need insulin if patient cannot make dietary changes and bring A1c down with noninsulin medications  Continue other meds  Can take Hydroxyzine, 1/2 tablet at night to prevent grogginess, difficulty wakiing   Follow up in 1 month with blood sugar log (daily)  Make follow up with GI for refills Omeprazole  Fasting lab work, can check with GI also if they want any labs done at same time. Return in about 4 weeks (around 2022) for blood sugar follow up.          Subjective   SUBJECTIVE/OBJECTIVE:  HPI    Diabetes: Janumet 2 times daily, farxiga daily. Was on Tresiba 18 units injected at night, but she was gaining weight, so she stopped it. Today her A1c is 9.9%, up from 8.4% in July. She has tried Victoza daily injections in the past and they gave her headaches, but is willing to try a weekly injection to better control blood sugar. Does not want to take insulin if possible. Has eye appt 4/11. Follows with podiatry. Has bone spurs on right toe and told she needs surgery, but they won't do it until A1c is <8.1%. She is not eating a diabetic diet. Has not been checking her sugars because they didn't make strips for her old meter, but has a new one now. She is on Lipitor 10 mg daily. Microalbumin was 9 on 10/30/20     GERD/Barretts Esophagus: follows with Ernesto Shrestha. Omeprazole 40 mg daily. Due for follow up. Depression/anxiety/insomnia: Cymbalta 30 mg, 3 capsules daily. She has a very stressful job. Hydroxyzine 25 mg at HS 2-3 times per week for sleep, but it makes her sleep very heavy. Vitamin D deficiency: Vit D 5000 IU daily. Review of Systems   All other systems reviewed and are negative. Objective   Physical Exam  Vitals reviewed. Constitutional:       General: She is not in acute distress. Appearance: She is well-developed. HENT:      Head: Normocephalic. Right Ear: External ear normal.      Left Ear: External ear normal.      Nose: Nose normal.      Mouth/Throat:      Pharynx: No oropharyngeal exudate. Eyes:      Conjunctiva/sclera: Conjunctivae normal.      Pupils: Pupils are equal, round, and reactive to light. Neck:      Thyroid: No thyromegaly. Cardiovascular:      Rate and Rhythm: Normal rate and regular rhythm. Heart sounds: Normal heart sounds. No murmur heard. No friction rub. No gallop. Pulmonary:      Effort: Pulmonary effort is normal. No respiratory distress. Breath sounds: Normal breath sounds. No wheezing or rales.    Abdominal:      General: There is no distension. Palpations: Abdomen is soft. There is no mass. Tenderness: There is no abdominal tenderness. There is no guarding. Musculoskeletal:         General: Normal range of motion. Cervical back: Normal range of motion and neck supple. Lymphadenopathy:      Cervical: No cervical adenopathy. Skin:     General: Skin is warm and dry. Capillary Refill: Capillary refill takes less than 2 seconds. Neurological:      Mental Status: She is alert and oriented to person, place, and time. Psychiatric:         Behavior: Behavior normal.         Thought Content: Thought content normal.         Judgment: Judgment normal.                  An electronic signature was used to authenticate this note.     --Ennis Schwab, APRAUGUSTIN - CNP Discharge Instructions for Left Distal Clavicle ORIF:    PRESCRIPTIONS:   Pain- An eRx will be sent electronically to your pharmacy for  before DC or the day of.  Oxycodone is prescribed for severe pain. Wean off the Oxycodone as soon as your can. Additionally you can take Extra Strength Tylenol with the Oxycodone. A prescription for Celebrex 200mg PO twice daily for 30 days was sent to you pharmacy electronically to assist with pain control at home.     Stool Softener- We also recommend Miralax  stool softener which is over the counter to take daily while you are on Oxycodone.    If you need any refills on your medications, please call Dr. Yates’s office when you have a 3-day supply left. Some of the medications (narcotics) cannot be called into a pharmacy and the prescription will need to be picked up at the office or mailed to you. If you were taking other medications for blood pressure, heart problems etc., you should discuss this with your family physician.     BANDAGE/INCISION CARE : A two layer water-resistant dressing is applied during surgery. You can remove the outer layer 3 days after surgery (Post-op Day 3) or sooner if it gets wet. The inner layer will stay on until your first post-operative appointment. The inner dressing is waterproof so you may shower immediately but avoid soaking in bathtub or swimming pool.     Once your dressing is removed, you may leave your incision open to air or apply a dry gauze dressing. The sutures are dissolvable and Dermabond (surgical glue) is applied. Skin glue will gradually come off as incision heals. If you notice redness, swelling or drainage around incision, contact Dr. Yates’s office immediately.      SLING: For the first two to six weeks after your surgery, you will be wearing your sling the majority of time, coming out of it to complete exercises given to you by the therapists and Dr. Yates upon your discharge from the hospital. After your first post-operative visit, Dr. Yates may suggest you come out of your sling during the day to do activities in front of you.  Wearing the sling alerts others around you to be cautious and to avoid accidentally striking your arm. Also, during this time do not use your arm to pull or push yourself out of bed or out of a chair.     ACTIVITY: NO shoulder ROM. Bend and straighten elbow a few times daily so it doesn't get stiff. Walk Plenty. No sitting around. NO lifting with operative arm. See Detailed instructions in your packet.    ICE: Ice plenty and often during the first 2 weeks. Protect skin from direct ice using a towel or pillow case barrier.    PHYSICAL THERAPY: Dr. Yates prefers that his patients do NOT do formal Physical Therapy until post-operative appointment, where Dr. Yates will teach you exercises to do own to work on range of motion. Although you should progress with some gentle range of motion, as demonstrated by Dr. Yates, do not force any shoulder motions. Most importantly, do not let anyone (family members, physical therapists, etc) force your arm into uncomfortable positions.      DRIVING: You are NOT permitted to drive a car while taking narcotic medication or when you are wearing a sling. Do not drive until after your first follow-up visit with your surgeon.     RETURNING TO WORK: Returning to work depends on the demands of your job and should be discussed with Dr. Yates before the surgery.     FOLLOW UP: Dr. Yates or his PA will need to see you for multiple appointments after your surgery. Typically, Dr. Yates orhis PA will see you back after 2 weeks, 6 weeks, 3 months and 6 months. You will be seen at 1 year, 2 years, 5 years and 10 year intervals thereafter.     **Please refer to patient post op info packet given to you at office visit for further details.

## 2024-02-21 NOTE — ASU PATIENT PROFILE, ADULT - FALL HARM RISK - HARM RISK INTERVENTIONS

## 2024-02-21 NOTE — ASU DISCHARGE PLAN (ADULT/PEDIATRIC) - NURSING INSTRUCTIONS
For any problems or concerns,contact your doctor. [facility] patients should call the [facility]. If you cannot reach the doctor or clinic, call [facility] Emergency Department at [phone] or go to your local Emergency Department.  A responsible adult should be with you for the rest of the day and night for your safety and to help you if you needed. Resume your medications as listed on the attached Medication Record. Begin with liquids and light food ( tea, toast, Jello, soups). Advance to what you normally eat. Liquids should taken in adequate amounts today.     CALL the DOCTOR:    -Fever greater than  101F  - Signs  of infection such as : increase pain,swelling,redness,or a bad  smell coming from the wound.  -Excessive amount of bleeding.  - Any pain that appears to be getting worse.  - Vomiting  -  If you have  not urinated 8 hours after surgery or have any difficulty urinating.     A responsible adult should be with you for the rest of the day and night for your safety and to help you if you needed.    Review attached FACT SHEET if applicable.

## 2024-02-22 ENCOUNTER — NON-APPOINTMENT (OUTPATIENT)
Age: 29
End: 2024-02-22

## 2024-02-25 DIAGNOSIS — S42.002A FRACTURE OF UNSPECIFIED PART OF LEFT CLAVICLE, INITIAL ENCOUNTER FOR CLOSED FRACTURE: ICD-10-CM
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
## 2024-02-25 DIAGNOSIS — F17.200 NICOTINE DEPENDENCE, UNSPECIFIED, UNCOMPLICATED: ICD-10-CM
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
## 2024-02-25 DIAGNOSIS — F41.9 ANXIETY DISORDER, UNSPECIFIED: ICD-10-CM

## 2024-02-25 DIAGNOSIS — F32.A DEPRESSION, UNSPECIFIED: ICD-10-CM

## 2024-03-05 ENCOUNTER — APPOINTMENT (OUTPATIENT)
Dept: ORTHOPEDIC SURGERY | Facility: CLINIC | Age: 29
End: 2024-03-05
Payer: COMMERCIAL

## 2024-03-05 PROCEDURE — 99024 POSTOP FOLLOW-UP VISIT: CPT

## 2024-03-05 PROCEDURE — 73000 X-RAY EXAM OF COLLAR BONE: CPT | Mod: LT

## 2024-03-05 NOTE — ADDENDUM
[FreeTextEntry1] : Documented by Angie Frazier acting as a scribe for Dr. Yates on 03/05/2024. All medical record entries made by the Scribe were at my, Dr. Yates's, direction and personally dictated by me on 03/05/2024. I have reviewed the chart and agree that the record accurately reflects my personal performance of the history, physical exam, procedure and imaging.

## 2024-03-05 NOTE — HISTORY OF PRESENT ILLNESS
[___ Weeks Post Op] : [unfilled] weeks post op [No Sign of Infection] : is showing no signs of infection [Doing Well] : is doing well [de-identified] : SPO Left clavicle ORIF DOS: 2/21/24 [Adequate Pain Control] : has adequate pain control [de-identified] : Patient is here today for 1st post operative visit. SPO Left clavicle ORIF DOS: 2/21/24  He denies fever or chills, redness around or near the incision site(s), numbness/tingling. He denies nausea/ vomiting and admits to their appetite since their surgery being back to normal. Normal bowel habits at this time. Patient presents today doing well with no complaints. He is very satisfied with the results of his surgery. Patient since their surgery has utilized tylenol as their primary pain control with relief in their symptoms. They no longer require narcotics for pain control.  [de-identified] : Incisions are clean, dry and intact. No surrounding erythema. No drainage. Wound appears to be healing well. He has active range of motion of 160  forward flexion. Motor and sensation are intact distally. He has full range of motion of all fingers with capillary refill of <2 seconds throughout. He has good nerve function and median nerve, ulnar, radial, PIN, AIN. He has no sensory deficits over the C5-T1 nerve roots. Radial pulses 2+. Able to make an A-OK sign and thumbs up sign: able to flex/extend thumb, able to cross middle over index finger. Full ROM elbow, wrist, hand [de-identified] : I had a discussion with the patient regarding the operative and postoperative course. The patient is doing well and has excellent ROM. He does not require physical therapy at this time and can work out on his own. Patient will follow up in 4 wks for repeat clinical assessment. All questions were answered and the patient verbalized understanding. The patient is in agreement with this treatment plan.  [de-identified] : 1 view of the left clavicle was obtained today that show no dislocation. Hardware in good alignment. There is good healing appreciated.

## 2024-04-04 ENCOUNTER — APPOINTMENT (OUTPATIENT)
Dept: ORTHOPEDIC SURGERY | Facility: CLINIC | Age: 29
End: 2024-04-04
Payer: COMMERCIAL

## 2024-04-04 DIAGNOSIS — S42.022S: ICD-10-CM

## 2024-04-04 PROCEDURE — 73000 X-RAY EXAM OF COLLAR BONE: CPT | Mod: LT

## 2024-04-04 PROCEDURE — 99024 POSTOP FOLLOW-UP VISIT: CPT

## 2024-04-04 NOTE — ADDENDUM
[FreeTextEntry1] : Documented by Angie Frazier acting as a scribe for Dr. Yates on 04/04/2024. All medical record entries made by the Scribe were at my, Dr. Yates's, direction and personally dictated by me on 04/04/2024. I have reviewed the chart and agree that the record accurately reflects my personal performance of the history, physical exam, procedure and imaging.

## 2024-06-19 ENCOUNTER — NON-APPOINTMENT (OUTPATIENT)
Age: 29
End: 2024-06-19

## 2024-06-20 PROBLEM — F41.9 ANXIETY DISORDER, UNSPECIFIED: Chronic | Status: ACTIVE | Noted: 2024-02-21

## 2024-06-20 PROBLEM — N20.0 CALCULUS OF KIDNEY: Chronic | Status: ACTIVE | Noted: 2024-02-21

## 2024-06-25 ENCOUNTER — APPOINTMENT (OUTPATIENT)
Dept: ORTHOPEDIC SURGERY | Facility: CLINIC | Age: 29
End: 2024-06-25

## 2024-06-25 VITALS
BODY MASS INDEX: 21 KG/M2 | WEIGHT: 150 LBS | DIASTOLIC BLOOD PRESSURE: 69 MMHG | HEART RATE: 71 BPM | HEIGHT: 71 IN | SYSTOLIC BLOOD PRESSURE: 107 MMHG

## 2024-06-25 DIAGNOSIS — M89.8X6 OTHER SPECIFIED DISORDERS OF BONE, LOWER LEG: ICD-10-CM

## 2024-06-25 DIAGNOSIS — M89.9 DISORDER OF BONE, UNSPECIFIED: ICD-10-CM

## 2024-06-25 PROCEDURE — 99213 OFFICE O/P EST LOW 20 MIN: CPT

## 2024-10-07 ENCOUNTER — APPOINTMENT (OUTPATIENT)
Dept: OTOLARYNGOLOGY | Facility: CLINIC | Age: 29
End: 2024-10-07
Payer: COMMERCIAL

## 2024-10-07 ENCOUNTER — NON-APPOINTMENT (OUTPATIENT)
Age: 29
End: 2024-10-07

## 2024-10-07 VITALS
SYSTOLIC BLOOD PRESSURE: 117 MMHG | WEIGHT: 155 LBS | DIASTOLIC BLOOD PRESSURE: 74 MMHG | HEART RATE: 54 BPM | BODY MASS INDEX: 20.99 KG/M2 | HEIGHT: 72 IN

## 2024-10-07 DIAGNOSIS — H60.503 UNSPECIFIED ACUTE NONINFECTIVE OTITIS EXTERNA, BILATERAL: ICD-10-CM

## 2024-10-07 DIAGNOSIS — H61.23 IMPACTED CERUMEN, BILATERAL: ICD-10-CM

## 2024-10-07 PROCEDURE — 99213 OFFICE O/P EST LOW 20 MIN: CPT | Mod: 25

## 2024-10-07 RX ORDER — HYDROCORTISONE AND ACETIC ACID OTIC 20.75; 10.375 MG/ML; MG/ML
1-2 SOLUTION AURICULAR (OTIC)
Qty: 1 | Refills: 2 | Status: ACTIVE | COMMUNITY
Start: 2024-10-07 | End: 1900-01-01

## 2024-10-21 ENCOUNTER — APPOINTMENT (OUTPATIENT)
Dept: OTOLARYNGOLOGY | Facility: CLINIC | Age: 29
End: 2024-10-21

## 2024-11-04 ENCOUNTER — APPOINTMENT (OUTPATIENT)
Dept: PHARMACY | Facility: CLINIC | Age: 29
End: 2024-11-04

## 2025-01-29 ENCOUNTER — APPOINTMENT (OUTPATIENT)
Dept: INTERNAL MEDICINE | Facility: CLINIC | Age: 30
End: 2025-01-29
Payer: COMMERCIAL

## 2025-01-29 VITALS
OXYGEN SATURATION: 97 % | HEART RATE: 74 BPM | RESPIRATION RATE: 14 BRPM | SYSTOLIC BLOOD PRESSURE: 110 MMHG | TEMPERATURE: 98.5 F | WEIGHT: 150 LBS | HEIGHT: 72 IN | DIASTOLIC BLOOD PRESSURE: 64 MMHG | BODY MASS INDEX: 20.32 KG/M2

## 2025-01-29 DIAGNOSIS — F41.9 ANXIETY DISORDER, UNSPECIFIED: ICD-10-CM

## 2025-01-29 DIAGNOSIS — F32.A ANXIETY DISORDER, UNSPECIFIED: ICD-10-CM

## 2025-01-29 PROCEDURE — 99213 OFFICE O/P EST LOW 20 MIN: CPT

## 2025-01-29 PROCEDURE — G0444 DEPRESSION SCREEN ANNUAL: CPT | Mod: 59

## 2025-01-29 PROCEDURE — G2211 COMPLEX E/M VISIT ADD ON: CPT | Mod: NC

## 2025-01-29 RX ORDER — VENLAFAXINE HYDROCHLORIDE 37.5 MG/1
37.5 CAPSULE, EXTENDED RELEASE ORAL
Qty: 30 | Refills: 1 | Status: ACTIVE | COMMUNITY
Start: 2025-01-29 | End: 1900-01-01

## 2025-03-12 ENCOUNTER — APPOINTMENT (OUTPATIENT)
Dept: OTOLARYNGOLOGY | Facility: CLINIC | Age: 30
End: 2025-03-12
Payer: COMMERCIAL

## 2025-03-12 VITALS
BODY MASS INDEX: 20.32 KG/M2 | WEIGHT: 150 LBS | DIASTOLIC BLOOD PRESSURE: 79 MMHG | HEIGHT: 72 IN | HEART RATE: 73 BPM | SYSTOLIC BLOOD PRESSURE: 137 MMHG

## 2025-03-12 DIAGNOSIS — J31.0 CHRONIC RHINITIS: ICD-10-CM

## 2025-03-12 DIAGNOSIS — H60.60 UNSPECIFIED CHRONIC OTITIS EXTERNA, UNSPECIFIED EAR: ICD-10-CM

## 2025-03-12 DIAGNOSIS — J34.2 DEVIATED NASAL SEPTUM: ICD-10-CM

## 2025-03-12 DIAGNOSIS — H61.23 IMPACTED CERUMEN, BILATERAL: ICD-10-CM

## 2025-03-12 PROCEDURE — 99213 OFFICE O/P EST LOW 20 MIN: CPT | Mod: 25

## 2025-04-10 ENCOUNTER — RX RENEWAL (OUTPATIENT)
Age: 30
End: 2025-04-10

## 2025-05-22 ENCOUNTER — APPOINTMENT (OUTPATIENT)
Dept: INTERNAL MEDICINE | Facility: CLINIC | Age: 30
End: 2025-05-22

## 2025-06-10 ENCOUNTER — LABORATORY RESULT (OUTPATIENT)
Age: 30
End: 2025-06-10

## 2025-06-10 ENCOUNTER — APPOINTMENT (OUTPATIENT)
Dept: INTERNAL MEDICINE | Facility: CLINIC | Age: 30
End: 2025-06-10
Payer: COMMERCIAL

## 2025-06-10 VITALS
WEIGHT: 146 LBS | OXYGEN SATURATION: 98 % | DIASTOLIC BLOOD PRESSURE: 72 MMHG | SYSTOLIC BLOOD PRESSURE: 110 MMHG | RESPIRATION RATE: 14 BRPM | TEMPERATURE: 98 F | BODY MASS INDEX: 19.77 KG/M2 | HEIGHT: 72 IN | HEART RATE: 78 BPM

## 2025-06-10 PROCEDURE — 99213 OFFICE O/P EST LOW 20 MIN: CPT | Mod: 25

## 2025-06-10 PROCEDURE — 99395 PREV VISIT EST AGE 18-39: CPT

## 2025-06-11 ENCOUNTER — TRANSCRIPTION ENCOUNTER (OUTPATIENT)
Age: 30
End: 2025-06-11

## 2025-06-11 LAB
ALBUMIN SERPL ELPH-MCNC: 4.7 G/DL
ALP BLD-CCNC: 62 U/L
ALT SERPL-CCNC: 20 U/L
ANION GAP SERPL CALC-SCNC: 14 MMOL/L
APPEARANCE: CLEAR
AST SERPL-CCNC: 34 U/L
BACTERIA: NEGATIVE /HPF
BILIRUB SERPL-MCNC: 0.3 MG/DL
BILIRUBIN URINE: NEGATIVE
BLOOD URINE: NEGATIVE
BUN SERPL-MCNC: 13 MG/DL
CALCIUM SERPL-MCNC: 9.7 MG/DL
CAST: 0 /LPF
CHLORIDE SERPL-SCNC: 103 MMOL/L
CHOLEST SERPL-MCNC: 223 MG/DL
CO2 SERPL-SCNC: 24 MMOL/L
COLOR: YELLOW
CREAT SERPL-MCNC: 1 MG/DL
EGFRCR SERPLBLD CKD-EPI 2021: 106 ML/MIN/1.73M2
EPITHELIAL CELLS: 0 /HPF
ESTIMATED AVERAGE GLUCOSE: 108 MG/DL
GLUCOSE QUALITATIVE U: NEGATIVE MG/DL
GLUCOSE SERPL-MCNC: 82 MG/DL
HBA1C MFR BLD HPLC: 5.4 %
HCT VFR BLD CALC: 40.5 %
HDLC SERPL-MCNC: 61 MG/DL
HGB BLD-MCNC: 13.4 G/DL
KETONES URINE: NEGATIVE MG/DL
LDLC SERPL-MCNC: 142 MG/DL
LEUKOCYTE ESTERASE URINE: NEGATIVE
MCHC RBC-ENTMCNC: 28.7 PG
MCHC RBC-ENTMCNC: 33.1 G/DL
MCV RBC AUTO: 86.7 FL
MICROSCOPIC-UA: NORMAL
NITRITE URINE: NEGATIVE
NONHDLC SERPL-MCNC: 162 MG/DL
PH URINE: 6
PLATELET # BLD AUTO: 250 K/UL
POTASSIUM SERPL-SCNC: 3.8 MMOL/L
PROT SERPL-MCNC: 6.9 G/DL
PROTEIN URINE: NEGATIVE MG/DL
RBC # BLD: 4.67 M/UL
RBC # FLD: 13.3 %
RED BLOOD CELLS URINE: 1 /HPF
SODIUM SERPL-SCNC: 141 MMOL/L
SPECIFIC GRAVITY URINE: 1.02
TRIGL SERPL-MCNC: 113 MG/DL
TSH SERPL-ACNC: 1.09 UIU/ML
UROBILINOGEN URINE: 0.2 MG/DL
WBC # FLD AUTO: 6.39 K/UL
WHITE BLOOD CELLS URINE: 0 /HPF

## 2025-06-23 ENCOUNTER — APPOINTMENT (OUTPATIENT)
Dept: ULTRASOUND IMAGING | Facility: CLINIC | Age: 30
End: 2025-06-23
Payer: COMMERCIAL

## 2025-06-23 ENCOUNTER — APPOINTMENT (OUTPATIENT)
Dept: MRI IMAGING | Facility: CLINIC | Age: 30
End: 2025-06-23
Payer: COMMERCIAL

## 2025-06-23 ENCOUNTER — OUTPATIENT (OUTPATIENT)
Dept: OUTPATIENT SERVICES | Facility: HOSPITAL | Age: 30
LOS: 1 days | End: 2025-06-23
Payer: COMMERCIAL

## 2025-06-23 DIAGNOSIS — N50.89 OTHER SPECIFIED DISORDERS OF THE MALE GENITAL ORGANS: ICD-10-CM

## 2025-06-23 DIAGNOSIS — M89.9 DISORDER OF BONE, UNSPECIFIED: ICD-10-CM

## 2025-06-23 DIAGNOSIS — N20.0 CALCULUS OF KIDNEY: ICD-10-CM

## 2025-06-23 PROCEDURE — 76870 US EXAM SCROTUM: CPT

## 2025-06-23 PROCEDURE — 76870 US EXAM SCROTUM: CPT | Mod: 26

## 2025-06-23 PROCEDURE — 76770 US EXAM ABDO BACK WALL COMP: CPT | Mod: 26

## 2025-06-23 PROCEDURE — 73718 MRI LOWER EXTREMITY W/O DYE: CPT

## 2025-06-23 PROCEDURE — 73718 MRI LOWER EXTREMITY W/O DYE: CPT | Mod: 26,LT

## 2025-06-23 PROCEDURE — 76770 US EXAM ABDO BACK WALL COMP: CPT

## 2025-07-08 ENCOUNTER — TRANSCRIPTION ENCOUNTER (OUTPATIENT)
Age: 30
End: 2025-07-08